# Patient Record
Sex: MALE
[De-identification: names, ages, dates, MRNs, and addresses within clinical notes are randomized per-mention and may not be internally consistent; named-entity substitution may affect disease eponyms.]

---

## 2021-01-26 ENCOUNTER — HOSPITAL ENCOUNTER (OUTPATIENT)
Dept: HOSPITAL 53 - M CARPUL | Age: 26
End: 2021-01-26
Attending: PHYSICIAN ASSISTANT
Payer: COMMERCIAL

## 2021-01-26 DIAGNOSIS — R06.00: Primary | ICD-10-CM

## 2021-01-26 PROCEDURE — 94070 EVALUATION OF WHEEZING: CPT

## 2021-01-26 NOTE — PFTRPT
Height: 72.00  Inches  Weight: 265.00  Lbs  BSA: 2.40

Diagnosis: R06.00



DATE: 01/26/2021



ORDERED BY: SKY Rhodes 

 

QUALITY: Study of excellent technical quality. 



PROCEDURE: Under protocol, methacholine was administered. At a dose of 25 mg or 
188.875 CDUs, a 24% decline in the FEV1 was noted, but a PC of 17.55 does not 
meet diagnostic criteria. Flow rates did return to baseline post bronchodilator 
administration. 



IMPRESSION: Nondiagnostic methacholine challenge study. Please correlate 
clinically. 

MTDD